# Patient Record
Sex: MALE | Race: BLACK OR AFRICAN AMERICAN | NOT HISPANIC OR LATINO | URBAN - METROPOLITAN AREA
[De-identification: names, ages, dates, MRNs, and addresses within clinical notes are randomized per-mention and may not be internally consistent; named-entity substitution may affect disease eponyms.]

---

## 2019-10-25 ENCOUNTER — EMERGENCY (EMERGENCY)
Facility: HOSPITAL | Age: 34
LOS: 1 days | Discharge: HOME | End: 2019-10-25
Attending: EMERGENCY MEDICINE | Admitting: EMERGENCY MEDICINE
Payer: COMMERCIAL

## 2019-10-25 VITALS
SYSTOLIC BLOOD PRESSURE: 116 MMHG | DIASTOLIC BLOOD PRESSURE: 76 MMHG | HEART RATE: 106 BPM | RESPIRATION RATE: 18 BRPM | TEMPERATURE: 100 F | OXYGEN SATURATION: 98 %

## 2019-10-25 LAB
ALBUMIN SERPL ELPH-MCNC: 4 G/DL — SIGNIFICANT CHANGE UP (ref 3.3–5)
ALP SERPL-CCNC: 62 U/L — SIGNIFICANT CHANGE UP (ref 40–120)
ALT FLD-CCNC: 44 U/L — HIGH (ref 4–41)
ANION GAP SERPL CALC-SCNC: 18 MMO/L — HIGH (ref 7–14)
AST SERPL-CCNC: 41 U/L — HIGH (ref 4–40)
BASOPHILS # BLD AUTO: 0.06 K/UL — SIGNIFICANT CHANGE UP (ref 0–0.2)
BASOPHILS NFR BLD AUTO: 0.4 % — SIGNIFICANT CHANGE UP (ref 0–2)
BILIRUB SERPL-MCNC: 0.4 MG/DL — SIGNIFICANT CHANGE UP (ref 0.2–1.2)
BUN SERPL-MCNC: 17 MG/DL — SIGNIFICANT CHANGE UP (ref 7–23)
CALCIUM SERPL-MCNC: 9.4 MG/DL — SIGNIFICANT CHANGE UP (ref 8.4–10.5)
CHLORIDE SERPL-SCNC: 100 MMOL/L — SIGNIFICANT CHANGE UP (ref 98–107)
CO2 SERPL-SCNC: 19 MMOL/L — LOW (ref 22–31)
CREAT SERPL-MCNC: 1.2 MG/DL — SIGNIFICANT CHANGE UP (ref 0.5–1.3)
EOSINOPHIL # BLD AUTO: 0.02 K/UL — SIGNIFICANT CHANGE UP (ref 0–0.5)
EOSINOPHIL NFR BLD AUTO: 0.1 % — SIGNIFICANT CHANGE UP (ref 0–6)
GLUCOSE SERPL-MCNC: 120 MG/DL — HIGH (ref 70–99)
HCT VFR BLD CALC: 43.5 % — SIGNIFICANT CHANGE UP (ref 39–50)
HGB BLD-MCNC: 14.1 G/DL — SIGNIFICANT CHANGE UP (ref 13–17)
IMM GRANULOCYTES NFR BLD AUTO: 0.5 % — SIGNIFICANT CHANGE UP (ref 0–1.5)
LYMPHOCYTES # BLD AUTO: 13.6 % — SIGNIFICANT CHANGE UP (ref 13–44)
LYMPHOCYTES # BLD AUTO: 2.32 K/UL — SIGNIFICANT CHANGE UP (ref 1–3.3)
MCHC RBC-ENTMCNC: 29.3 PG — SIGNIFICANT CHANGE UP (ref 27–34)
MCHC RBC-ENTMCNC: 32.4 % — SIGNIFICANT CHANGE UP (ref 32–36)
MCV RBC AUTO: 90.4 FL — SIGNIFICANT CHANGE UP (ref 80–100)
MONOCYTES # BLD AUTO: 2.28 K/UL — HIGH (ref 0–0.9)
MONOCYTES NFR BLD AUTO: 13.3 % — SIGNIFICANT CHANGE UP (ref 2–14)
NEUTROPHILS # BLD AUTO: 12.35 K/UL — HIGH (ref 1.8–7.4)
NEUTROPHILS NFR BLD AUTO: 72.1 % — SIGNIFICANT CHANGE UP (ref 43–77)
NRBC # FLD: 0 K/UL — SIGNIFICANT CHANGE UP (ref 0–0)
PLATELET # BLD AUTO: 328 K/UL — SIGNIFICANT CHANGE UP (ref 150–400)
PMV BLD: 10.2 FL — SIGNIFICANT CHANGE UP (ref 7–13)
POTASSIUM SERPL-MCNC: 3.2 MMOL/L — LOW (ref 3.5–5.3)
POTASSIUM SERPL-SCNC: 3.2 MMOL/L — LOW (ref 3.5–5.3)
PROT SERPL-MCNC: 8.4 G/DL — HIGH (ref 6–8.3)
RBC # BLD: 4.81 M/UL — SIGNIFICANT CHANGE UP (ref 4.2–5.8)
RBC # FLD: 12.7 % — SIGNIFICANT CHANGE UP (ref 10.3–14.5)
SODIUM SERPL-SCNC: 137 MMOL/L — SIGNIFICANT CHANGE UP (ref 135–145)
WBC # BLD: 17.12 K/UL — HIGH (ref 3.8–10.5)
WBC # FLD AUTO: 17.12 K/UL — HIGH (ref 3.8–10.5)

## 2019-10-25 PROCEDURE — 70491 CT SOFT TISSUE NECK W/DYE: CPT | Mod: 26

## 2019-10-25 PROCEDURE — 99218: CPT | Mod: 25

## 2019-10-25 PROCEDURE — 42700 I&D ABSCESS PERITONSILLAR: CPT | Mod: 73

## 2019-10-25 RX ORDER — KETOROLAC TROMETHAMINE 30 MG/ML
15 SYRINGE (ML) INJECTION ONCE
Refills: 0 | Status: DISCONTINUED | OUTPATIENT
Start: 2019-10-25 | End: 2019-10-25

## 2019-10-25 RX ORDER — SODIUM CHLORIDE 9 MG/ML
1000 INJECTION, SOLUTION INTRAVENOUS ONCE
Refills: 0 | Status: COMPLETED | OUTPATIENT
Start: 2019-10-25 | End: 2019-10-25

## 2019-10-25 RX ORDER — TETRACAINE/BENZOCAINE/BUTAMBEN 2%-14%-2%
1 OINTMENT (GRAM) TOPICAL ONCE
Refills: 0 | Status: COMPLETED | OUTPATIENT
Start: 2019-10-25 | End: 2019-10-25

## 2019-10-25 RX ORDER — LIDOCAINE 4 G/100G
15 CREAM TOPICAL ONCE
Refills: 0 | Status: COMPLETED | OUTPATIENT
Start: 2019-10-25 | End: 2019-10-25

## 2019-10-25 RX ORDER — ACETAMINOPHEN 500 MG
975 TABLET ORAL ONCE
Refills: 0 | Status: COMPLETED | OUTPATIENT
Start: 2019-10-25 | End: 2019-10-25

## 2019-10-25 RX ORDER — SODIUM CHLORIDE 9 MG/ML
1000 INJECTION, SOLUTION INTRAVENOUS
Refills: 0 | Status: DISCONTINUED | OUTPATIENT
Start: 2019-10-25 | End: 2019-11-17

## 2019-10-25 RX ORDER — DEXAMETHASONE 0.5 MG/5ML
10 ELIXIR ORAL ONCE
Refills: 0 | Status: COMPLETED | OUTPATIENT
Start: 2019-10-25 | End: 2019-10-25

## 2019-10-25 RX ORDER — DEXAMETHASONE 0.5 MG/5ML
10 ELIXIR ORAL
Refills: 0 | Status: COMPLETED | OUTPATIENT
Start: 2019-10-26 | End: 2019-10-26

## 2019-10-25 RX ADMIN — Medication 102 MILLIGRAM(S): at 09:30

## 2019-10-25 RX ADMIN — Medication 102 MILLIGRAM(S): at 20:28

## 2019-10-25 RX ADMIN — Medication 100 MILLIGRAM(S): at 22:23

## 2019-10-25 RX ADMIN — Medication 15 MILLIGRAM(S): at 10:45

## 2019-10-25 RX ADMIN — LIDOCAINE 15 MILLILITER(S): 4 CREAM TOPICAL at 15:16

## 2019-10-25 RX ADMIN — SODIUM CHLORIDE 1000 MILLILITER(S): 9 INJECTION, SOLUTION INTRAVENOUS at 09:30

## 2019-10-25 RX ADMIN — Medication 975 MILLIGRAM(S): at 10:30

## 2019-10-25 RX ADMIN — SODIUM CHLORIDE 125 MILLILITER(S): 9 INJECTION, SOLUTION INTRAVENOUS at 18:55

## 2019-10-25 RX ADMIN — Medication 15 MILLIGRAM(S): at 09:27

## 2019-10-25 RX ADMIN — Medication 1 SPRAY(S): at 11:00

## 2019-10-25 RX ADMIN — Medication 975 MILLIGRAM(S): at 09:25

## 2019-10-25 RX ADMIN — Medication 100 MILLIGRAM(S): at 12:30

## 2019-10-25 NOTE — ED PROVIDER NOTE - CLINICAL SUMMARY MEDICAL DECISION MAKING FREE TEXT BOX
Suspect left sided peritonsillar abscess, but exam is limited due to patient factors, also duration of symptoms and recent dental work raises the suspicion for a deeper space infection. Airway is protected at this time, will get CT of the neck to evaluate for deeper infection, give dose of steroids and IV analgesia. Silvio att: Suspect left sided peritonsillar abscess, but exam is limited due to patient factors, also duration of symptoms and recent dental work raises the suspicion for a deeper space infection. Airway is protected at this time, will get CT of the neck to evaluate for deeper infection, give dose of steroids and IV analgesia.

## 2019-10-25 NOTE — ED PROVIDER NOTE - ENMT, MLM
Airway patent, Nasal mucosa clear. Muffled voice but no stridor, tolerating secretions, mild trismus. Limited exam of posterior pharynx secondary to discomfort and gag. Uvular deviation to the right, scant bilateral tonsillar exudates

## 2019-10-25 NOTE — ED CDU PROVIDER INITIAL DAY NOTE - OBJECTIVE STATEMENT
33yom no PMH has been complaining of sore throat for the last 5-6 days, went to [facility] initially where he was diagnosed with strep throat and started on augmentin and ibuprofen. Currently on day 5 of treatment and has had minimal relief of symptoms, still with subjective fevers and increasing odynophagia, muffled voice, and malaise. Comes to ED today because symptoms have not been improving. Also reports dental extraction on 10/15 just before onset of symptoms.    As above.  Noted to have possible early left pta on ct.  ED team attempted bedside drainage.

## 2019-10-25 NOTE — ED ADULT NURSE NOTE - OBJECTIVE STATEMENT
32y/o male presents to ED with c/o sore throat.  Pt states that he had a tooth pulled on 10/14/19 and has had a progressively worsening sore throat since that time.  Pt states that he was seen at [facility] on Sunday and was dx with strep throat, placed on Augmentin and Motrin.  Pt states that he is not feeling better, still having subjective fever, decreased appetite.  Pt speaking full sentences, pt's SO states that pt's voice sounds somewhat different, pt able to swallow but states that it is painful.  No resp distress, no n/v/d, no full feeling in neck, no HA. IV established, labs drawn and sent, pt medicated as per order, will cont to monitor. Providers evaluating

## 2019-10-25 NOTE — ED CDU PROVIDER INITIAL DAY NOTE - TOBACCO USE
Unknown if ever smoked Number Of Freeze-Thaw Cycles: 2 freeze-thaw cycles Render Post-Care Instructions In Note?: no Consent: The patient's consent was obtained including but not limited to risks of crusting, scabbing, blistering, scarring, darker or lighter pigmentary change, recurrence, incomplete removal and infection. Medical Necessity Clause: This procedure was medically necessary because the lesions that were treated were: Duration Of Freeze Thaw-Cycle (Seconds): 5-10 Post-Care Instructions: I reviewed with the patient in detail post-care instructions. Patient is to wear sunprotection, and avoid picking at any of the treated lesions. Pt may apply Vaseline to crusted or scabbing areas. Detail Level: Detailed Medical Necessity Information: It is in your best interest to select a reason for this procedure from the list below. All of these items fulfill various CMS LCD requirements except the new and changing color options. Duration Of Freeze Thaw-Cycle (Seconds): 0

## 2019-10-25 NOTE — ED CDU PROVIDER INITIAL DAY NOTE - MEDICAL DECISION MAKING DETAILS
34 y/o male failed o/p abx therapy for pharyngitis.  Possible left early PTA.  Pending i&d by main ED team.  To be sent to cdu for abx, ivf, pain control.

## 2019-10-25 NOTE — ED ADULT NURSE NOTE - NSIMPLEMENTINTERV_GEN_ALL_ED
Implemented All Universal Safety Interventions:  [place] to call system. Call bell, personal items and telephone within reach. Instruct patient to call for assistance. Room bathroom lighting operational. Non-slip footwear when patient is off stretcher. Physically safe environment: no spills, clutter or unnecessary equipment. Stretcher in lowest position, wheels locked, appropriate side rails in place.

## 2019-10-25 NOTE — ED CDU PROVIDER INITIAL DAY NOTE - ATTENDING CONTRIBUTION TO CARE
CDU MD CELAYA:  I performed a face to face bedside interview with patient regarding history of present illness, review of symptoms and past medical history. I completed an independent physical exam.  I have discussed patient's plan of care with PA.   I agree with note as stated above, having amended the EMR as needed to reflect my findings. I have discussed the assessment and plan of care.  This includes during the time I functioned as the attending physician for this patient.    HPI / ROS / PE / MDM written by myself.

## 2019-10-25 NOTE — ED ADULT TRIAGE NOTE - CHIEF COMPLAINT QUOTE
states" I am having strep throat since Sunday and taking Augmentin with no relief". unable to swallow liquids/solids. taking Motrin with no relief

## 2019-10-25 NOTE — ED PROVIDER NOTE - OBJECTIVE STATEMENT
33yom no PMH has been complaining of sore throat for the last 5-6 days, went to [facility] initially where he was diagnosed with strep throat and started on augmentin and ibuprofen. Currently on day 5 of treatment and has had minimal relief of symptoms, still with subjective fevers and increasing odynophagia, muffled voice, and malaise. Comes to ED today because symptoms have not been improving. Also reports dental extraction on 10/15 just before onset of symptoms.

## 2019-10-25 NOTE — PROGRESS NOTE ADULT - SUBJECTIVE AND OBJECTIVE BOX
Patient is a 33y old  Male who presents with a chief complaint of     HPI:  33yom no PMH has been complaining of sore throat for the last 5-6 days. Pt had 2 maxillary left teeth extracted and 1 mandibular left wisdom tooth extracted on 10/15/19, was placed on Amoxicillin which he took for 4 days. Pt went to [facility] initially where he was diagnosed with strep throat and started on augmentin and ibuprofen. Currently on day 5 of treatment and has had minimal relief of symptoms, still with subjective fevers and increasing odynophagia, muffled voice, and malaise. Comes to ED today because symptoms have not been improving.       PAST MEDICAL & SURGICAL HISTORY:  No pertinent past medical history  No significant past surgical history      MEDICATIONS  (STANDING):  clindamycin IVPB      clindamycin IVPB 600 milliGRAM(s) IV Intermittent every 8 hours  lactated ringers. 1000 milliLiter(s) (125 mL/Hr) IV Continuous <Continuous>    MEDICATIONS  (PRN):      Allergies    No Known Allergies    Intolerances        FAMILY HISTORY:      *SOCIAL HISTORY: (guardian or who pt came with), (smoking hx)    *Last Dental Visit: 10/15 for extraction of maxillary teeth #14,16, mandibular tooth #17    Vital Signs Last 24 Hrs  T(C): 36.9 (25 Oct 2019 21:47), Max: 37.8 (25 Oct 2019 08:47)  T(F): 98.4 (25 Oct 2019 21:47), Max: 100.1 (25 Oct 2019 08:47)  HR: 59 (25 Oct 2019 21:47) (59 - 106)  BP: 111/73 (25 Oct 2019 21:47) (103/62 - 116/76)  BP(mean): --  RR: 18 (25 Oct 2019 21:47) (16 - 18)  SpO2: 98% (25 Oct 2019 21:47) (98% - 99%)    EOE:  TMJ ( -  ) clicks                    (  -  ) pops                    (  -  ) crepitus             Mandible FROM             Facial bones and MOM grossly intact             ( -  ) trismus             ( -  ) LAD             ( -  ) swelling             ( -  ) asymmetry             ( -  ) palpation             ( -  ) SOB             ( -  ) dysphagia             ( -  ) LOC    IOE:   secondary dentition, multiple missing teeth, previously extracted teeth sites healing wnl, negative signs of infection           hard/soft palate:  ( -  ) palatal torus           tongue/FOM WNL           labial/buccal mucosa WNL           (  - ) percussion           ( -  ) palpation           ( -  ) swelling       LABS:                        14.1   17.12 )-----------( 328      ( 25 Oct 2019 09:10 )             43.5     10-25    137  |  100  |  17  ----------------------------<  120<H>  3.2<L>   |  19<L>  |  1.20    Ca    9.4      25 Oct 2019 09:10    TPro  8.4<H>  /  Alb  4.0  /  TBili  0.4  /  DBili  x   /  AST  41<H>  /  ALT  44<H>  /  AlkPhos  62  10-25    WBC Count: 17.12 K/uL <H> [3.8 - 10.5] (10-25 @ 09:10)  Platelet Count - Automated: 328 K/uL [150 - 400] (10-25 @ 09:10)        *DENTAL RADIOGRAPHS: 1 Panoramic      ASSESSMENT: Findings eval with Dr. Gabriele Worthington (OS)  Following extraction on 10/15/19 of maxillary teeth #14,16, mandibular tooth #17, pt is healing wnl.  Negative signs of odontogenic infection, negative swelling/fistula/pain/mobility. Negative buccal/lingual swelling.  Tooth #18 (mandibular left molar)h as caries, negative acute dental infection. Advised pt to f/u with outside dentist for comp. care.  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  Negative acute signs of odontogenic infection    PROCEDURE:  Verbal and written consent given.   EOE/ IOE, radiograph    RECOMMENDATIONS:  1) F/U with dentist for comp dental treatment   2) Dental F/U with outpatient dentist for comprehensive dental care.   3) If any difficulty swallowing/breathing, fever occur, page dental.     Mainor Ocasio DMD

## 2019-10-25 NOTE — ED PROVIDER NOTE - PROGRESS NOTE DETAILS
Guszack: CT w/ evidence of phlegmonous tonsillitis, small pocket which might be developing abscess but no discrete abscess. Attempted needle aspiration, no pus obtained, unable to tolerate additional attempts due to gag reflex and discomfort. Will place in CDU for IV antibiotics and observation, repeat evaluation in the morning.

## 2019-10-25 NOTE — ED CDU PROVIDER INITIAL DAY NOTE - MUSCULOSKELETAL, MLM
Spontaneous, unlabored and symmetrical Spine appears normal, range of motion is not limited, no muscle or joint tenderness

## 2019-10-26 VITALS
DIASTOLIC BLOOD PRESSURE: 84 MMHG | HEART RATE: 77 BPM | OXYGEN SATURATION: 98 % | SYSTOLIC BLOOD PRESSURE: 120 MMHG | RESPIRATION RATE: 15 BRPM | TEMPERATURE: 99 F

## 2019-10-26 DIAGNOSIS — K08.409 PARTIAL LOSS OF TEETH, UNSPECIFIED CAUSE, UNSPECIFIED CLASS: Chronic | ICD-10-CM

## 2019-10-26 LAB
ALBUMIN SERPL ELPH-MCNC: 3.5 G/DL — SIGNIFICANT CHANGE UP (ref 3.3–5)
ALP SERPL-CCNC: 63 U/L — SIGNIFICANT CHANGE UP (ref 40–120)
ALT FLD-CCNC: 47 U/L — HIGH (ref 4–41)
ANION GAP SERPL CALC-SCNC: 15 MMO/L — HIGH (ref 7–14)
ANISOCYTOSIS BLD QL: SLIGHT — SIGNIFICANT CHANGE UP
AST SERPL-CCNC: 29 U/L — SIGNIFICANT CHANGE UP (ref 4–40)
BASOPHILS # BLD AUTO: 0.03 K/UL — SIGNIFICANT CHANGE UP (ref 0–0.2)
BASOPHILS NFR BLD AUTO: 0.2 % — SIGNIFICANT CHANGE UP (ref 0–2)
BASOPHILS NFR SPEC: 0 % — SIGNIFICANT CHANGE UP (ref 0–2)
BILIRUB SERPL-MCNC: 0.3 MG/DL — SIGNIFICANT CHANGE UP (ref 0.2–1.2)
BLASTS # FLD: 0 % — SIGNIFICANT CHANGE UP (ref 0–0)
BUN SERPL-MCNC: 22 MG/DL — SIGNIFICANT CHANGE UP (ref 7–23)
CALCIUM SERPL-MCNC: 9.6 MG/DL — SIGNIFICANT CHANGE UP (ref 8.4–10.5)
CHLORIDE SERPL-SCNC: 102 MMOL/L — SIGNIFICANT CHANGE UP (ref 98–107)
CO2 SERPL-SCNC: 21 MMOL/L — LOW (ref 22–31)
CREAT SERPL-MCNC: 0.8 MG/DL — SIGNIFICANT CHANGE UP (ref 0.5–1.3)
EOSINOPHIL # BLD AUTO: 0.02 K/UL — SIGNIFICANT CHANGE UP (ref 0–0.5)
EOSINOPHIL NFR BLD AUTO: 0.1 % — SIGNIFICANT CHANGE UP (ref 0–6)
EOSINOPHIL NFR FLD: 0 % — SIGNIFICANT CHANGE UP (ref 0–6)
GIANT PLATELETS BLD QL SMEAR: PRESENT — SIGNIFICANT CHANGE UP
GLUCOSE SERPL-MCNC: 160 MG/DL — HIGH (ref 70–99)
HCT VFR BLD CALC: 48.5 % — SIGNIFICANT CHANGE UP (ref 39–50)
HGB BLD-MCNC: 15.9 G/DL — SIGNIFICANT CHANGE UP (ref 13–17)
IMM GRANULOCYTES NFR BLD AUTO: 0.7 % — SIGNIFICANT CHANGE UP (ref 0–1.5)
LYMPHOCYTES # BLD AUTO: 1.2 K/UL — SIGNIFICANT CHANGE UP (ref 1–3.3)
LYMPHOCYTES # BLD AUTO: 6.8 % — LOW (ref 13–44)
LYMPHOCYTES NFR SPEC AUTO: 4.4 % — LOW (ref 13–44)
MACROCYTES BLD QL: SLIGHT — SIGNIFICANT CHANGE UP
MCHC RBC-ENTMCNC: 29.8 PG — SIGNIFICANT CHANGE UP (ref 27–34)
MCHC RBC-ENTMCNC: 32.8 % — SIGNIFICANT CHANGE UP (ref 32–36)
MCV RBC AUTO: 91 FL — SIGNIFICANT CHANGE UP (ref 80–100)
METAMYELOCYTES # FLD: 0 % — SIGNIFICANT CHANGE UP (ref 0–1)
MONOCYTES # BLD AUTO: 0.46 K/UL — SIGNIFICANT CHANGE UP (ref 0–0.9)
MONOCYTES NFR BLD AUTO: 2.6 % — SIGNIFICANT CHANGE UP (ref 2–14)
MONOCYTES NFR BLD: 4.4 % — SIGNIFICANT CHANGE UP (ref 2–9)
MYELOCYTES NFR BLD: 0 % — SIGNIFICANT CHANGE UP (ref 0–0)
NEUTROPHIL AB SER-ACNC: 83.3 % — HIGH (ref 43–77)
NEUTROPHILS # BLD AUTO: 15.85 K/UL — HIGH (ref 1.8–7.4)
NEUTROPHILS NFR BLD AUTO: 89.6 % — HIGH (ref 43–77)
NEUTS BAND # BLD: 7 % — HIGH (ref 0–6)
NRBC # FLD: 0 K/UL — SIGNIFICANT CHANGE UP (ref 0–0)
OTHER - HEMATOLOGY %: 0 — SIGNIFICANT CHANGE UP
PLATELET # BLD AUTO: 368 K/UL — SIGNIFICANT CHANGE UP (ref 150–400)
PLATELET COUNT - ESTIMATE: NORMAL — SIGNIFICANT CHANGE UP
PMV BLD: 10.1 FL — SIGNIFICANT CHANGE UP (ref 7–13)
POTASSIUM SERPL-MCNC: 3.9 MMOL/L — SIGNIFICANT CHANGE UP (ref 3.5–5.3)
POTASSIUM SERPL-SCNC: 3.9 MMOL/L — SIGNIFICANT CHANGE UP (ref 3.5–5.3)
PROMYELOCYTES # FLD: 0 % — SIGNIFICANT CHANGE UP (ref 0–0)
PROT SERPL-MCNC: 8.3 G/DL — SIGNIFICANT CHANGE UP (ref 6–8.3)
RBC # BLD: 5.33 M/UL — SIGNIFICANT CHANGE UP (ref 4.2–5.8)
RBC # FLD: 12.5 % — SIGNIFICANT CHANGE UP (ref 10.3–14.5)
SODIUM SERPL-SCNC: 138 MMOL/L — SIGNIFICANT CHANGE UP (ref 135–145)
VARIANT LYMPHS # BLD: 0.9 % — SIGNIFICANT CHANGE UP
WBC # BLD: 17.69 K/UL — HIGH (ref 3.8–10.5)
WBC # FLD AUTO: 17.69 K/UL — HIGH (ref 3.8–10.5)

## 2019-10-26 PROCEDURE — 99217: CPT | Mod: 25

## 2019-10-26 RX ORDER — IBUPROFEN 200 MG
1 TABLET ORAL
Qty: 20 | Refills: 0
Start: 2019-10-26 | End: 2019-10-30

## 2019-10-26 RX ORDER — KETOROLAC TROMETHAMINE 30 MG/ML
30 SYRINGE (ML) INJECTION EVERY 6 HOURS
Refills: 0 | Status: DISCONTINUED | OUTPATIENT
Start: 2019-10-26 | End: 2019-10-26

## 2019-10-26 RX ORDER — AMPICILLIN SODIUM AND SULBACTAM SODIUM 250; 125 MG/ML; MG/ML
0 INJECTION, POWDER, FOR SUSPENSION INTRAMUSCULAR; INTRAVENOUS
Qty: 0 | Refills: 0 | DISCHARGE

## 2019-10-26 RX ORDER — IBUPROFEN 200 MG
0 TABLET ORAL
Qty: 0 | Refills: 0 | DISCHARGE

## 2019-10-26 RX ADMIN — Medication 102 MILLIGRAM(S): at 04:24

## 2019-10-26 RX ADMIN — Medication 100 MILLIGRAM(S): at 06:52

## 2019-10-26 RX ADMIN — SODIUM CHLORIDE 125 MILLILITER(S): 9 INJECTION, SOLUTION INTRAVENOUS at 04:25

## 2019-10-26 NOTE — ED CDU PROVIDER DISPOSITION NOTE - NSFOLLOWUPINSTRUCTIONS_ED_ALL_ED_FT
Follow up with your Primary Medical Doctor within 2-3days. If results or reports were given to you, show copies of your reports given to you. Take all of your medications as previously prescribed.    Take Clindamycin 300mg every 8 hours for 7 days.  Take Motrin 600mg every 8hrs with food for pain   Take Tylenol 650mg 1 tab every 4-6 hours as needed for pain or fever greater than 99.9     If any worsening, new, concerning symptoms return to the ED.

## 2019-10-26 NOTE — ED CDU PROVIDER DISPOSITION NOTE - CLINICAL COURSE
34 yo male, no stated PMH other than recent tooth extraction x 3 to left side on 10/15/19; pt presented to the ED for sore throat x 5-6 days.  Pt. had been Rx'd Augmentin for "strep throat" and was on day #5 of regimen when returned to this ED stating no improvement of symptoms.  In the ED, WBC 17.12, HbA1c 5.8, CT soft tissue neck: "....The exam findings are consistent with tonsillitis, left greater than right. Small areas of focal low density involves the left tonsil measuring up to 1 cm in size which could reflect a phlegmon or early developing abscess.  No evidence for epiglottitis..."  Pt. was started on IV clindamycin; I&D was attempted by the ED Providers; per Procedure Note, pt was "unable to tolerate procedure secondary to gag, procedure aborted".  Pt. was dispo'd to CDU for continuation of IV antibiotics, supportive care measures, and general observation care / monitoring.  In addition, in the body of the CT soft tissue neck report, it was noted:  "...BONES:  A left first maxillary molar tooth extraction socket is noted.   Asymmetric left-sided gingival buccal soft tissue swelling is noted which may reflect an associated soft tissue infection, without abscess formation.  A tooth extraction socket involves the left mandibular third molar tooth socket.  Periapical lucencies surrounding the left second mandibular molar tooth with adjacent bony sclerosis which could reflect a chronic osteitis or chronic osteomyelitis from dental infection...".  A dental consult was called; per Dental provider evaluating pt, stated extraction sites are healing wnl, radiographic findings are consistent with healing process s/p tooth extraction; stated no signs of odontogenic infection at this time.  In the interim, pt objectively noted to be resting comfortably; no issues or c/o in the interim thus far. Will continue to monitor; am labs ordered.. symptomatic improvement, no dysphagia, tolerating PO. afebrile, vss.

## 2019-10-26 NOTE — ED CDU PROVIDER SUBSEQUENT DAY NOTE - HISTORY
32 yo male, no stated PMH other than recent tooth extraction x 3 to left side on 10/15/19; pt presented to the ED for sore throat x 5-6 days.  Pt. had been Rx'd Augmentin for "strep throat" and was on day #5 of regimen when returned to this ED stating no improvement of symptoms.  In the ED, WBC 17.12, HbA1c 5.8, CT soft tissue neck: "....The exam findings are consistent with tonsillitis, left greater than right. Small areas of focal low density involves the left tonsil measuring up to 1 cm in size which could reflect a phlegmon or early developing abscess.  No evidence for epiglottitis..."  Pt. was started on IV clindamycin; I&D was attempted by the ED Providers; per Procedure Note, pt was "unable to tolerate procedure secondary to gag, procedure aborted".  Pt. was dispo'd to CDU for continuation of IV antibiotics, supportive care measures, and general observation care / monitoring.  In addition, in the body of the CT soft tissue neck report, it was noted:  "...BONES:  A left first maxillary molar tooth extraction socket is noted.   Asymmetric left-sided gingival buccal soft tissue swelling is noted which may reflect an associated soft tissue infection, without abscess formation.  A tooth extraction socket involves the left mandibular third molar tooth socket.  Periapical lucencies surrounding the left second mandibular molar tooth with adjacent bony sclerosis which could reflect a chronic osteitis or chronic osteomyelitis from dental infection...".  A dental consult was called; per Dental provider evaluating pt, stated extraction sites are healing wnl, radiographic findings are consistent with healing process s/p tooth extraction; stated no signs of odontogenic infection at this time.  In the interim, pt objectively noted to be resting comfortably; no issues or c/o in the interim thus far. Will continue to monitor; am labs ordered.

## 2019-10-26 NOTE — ED CDU PROVIDER SUBSEQUENT DAY NOTE - ENMT, MLM
Airway patent. Nasal mucosa clear. Mouth with moist mucosa. Throat:  Tonsillar enlargement, mostly symmetrical with slightly more prominence noted on the left tonsillar pillar; both tonsils have exudates present.  Soft palate is symmetrical bilaterally; uvula midline and without edema; tonsils are not approximating each other.  Neck supple.  Pt verbalizing clearly and effortlessly.

## 2019-10-26 NOTE — ED CDU PROVIDER SUBSEQUENT DAY NOTE - ATTENDING CONTRIBUTION TO CARE
I performed a face to face bedside interview with patient regarding history of present illness, review of symptoms and past medical history. I completed an independent physical exam.  I have discussed patient's plan of care.   I agree with note as stated above, having amended the EMR as needed to reflect my findings. I have discussed the assessment and plan of care.  This includes during the time I functioned as the attending physician for this patient.  Attending Contribution to Care: agree with plan of PA. 34 yo male, no stated PMH other than recent tooth extraction x 3 to left side on 10/15/19; pt presented to the ED for sore throat x 5-6 days.  Pt. had been Rx'd Augmentin for "strep throat" and was on day #5 of regimen when returned to this ED stating no improvement of symptoms.  In the ED, WBC 17.12, HbA1c 5.8, CT soft tissue neck: "....The exam findings are consistent with tonsillitis, left greater than right. Small areas of focal low density involves the left tonsil measuring up to 1 cm in size which could reflect a phlegmon or early developing abscess.  No evidence for epiglottitis..."  Pt. was started on IV clindamycin; I&D was attempted by the ED Providers; per Procedure Note, pt was "unable to tolerate procedure secondary to gag, procedure aborted".  Pt. was dispo'd to CDU for continuation of IV antibiotics, supportive care measures, and general observation care / monitoring.  In addition, in the body of the CT soft tissue neck report, it was noted:  "...BONES:  A left first maxillary molar tooth extraction socket is noted.   Asymmetric left-sided gingival buccal soft tissue swelling is noted which may reflect an associated soft tissue infection, without abscess formation.  A tooth extraction socket involves the left mandibular third molar tooth socket.  Periapical lucencies surrounding the left second mandibular molar tooth with adjacent bony sclerosis which could reflect a chronic osteitis or chronic osteomyelitis from dental infection...".  A dental consult was called; per Dental provider evaluating pt, stated extraction sites are healing wnl, radiographic findings are consistent with healing process s/p tooth extraction; stated no signs of odontogenic infection at this time.  In the interim, pt objectively noted to be resting comfortably; no issues or c/o in the interim thus far. Will continue to monitor; am labs ordered.

## 2019-10-26 NOTE — ED CDU PROVIDER DISPOSITION NOTE - PATIENT PORTAL LINK FT
You can access the FollowMyHealth Patient Portal offered by [facility] by registering at the following website: [email or web address]. By joining [facility]’s FollowMyHealth portal, you will also be able to view your health information using other applications (apps) compatible with our system.

## 2019-10-26 NOTE — ED CDU PROVIDER DISPOSITION NOTE - ATTENDING CONTRIBUTION TO CARE
I performed a face to face bedside interview with patient regarding history of present illness, review of symptoms and past medical history. I completed an independent physical exam.  I have discussed patient's plan of care.   I agree with note as stated above, having amended the EMR as needed to reflect my findings. I have discussed the assessment and plan of care.  This includes during the time I functioned as the attending physician for this patient.  Attending Contribution to Care: agree with plan of pa. 32 yo male, no stated PMH other than recent tooth extraction x 3 to left side on 10/15/19; pt presented to the ED for sore throat x 5-6 days.  Pt. had been Rx'd Augmentin for "strep throat" and was on day #5 of regimen when returned to this ED stating no improvement of symptoms.  In the ED, WBC 17.12, HbA1c 5.8, CT soft tissue neck: "....The exam findings are consistent with tonsillitis, left greater than right. Small areas of focal low density involves the left tonsil measuring up to 1 cm in size which could reflect a phlegmon or early developing abscess.  No evidence for epiglottitis..."  Pt. was started on IV clindamycin; I&D was attempted by the ED Providers; per Procedure Note, pt was "unable to tolerate procedure secondary to gag, procedure aborted".  Pt. was dispo'd to CDU for continuation of IV antibiotics, supportive care measures, and general observation care / monitoring.  In addition, in the body of the CT soft tissue neck report, it was noted:  "...BONES:  A left first maxillary molar tooth extraction socket is noted.   Asymmetric left-sided gingival buccal soft tissue swelling is noted which may reflect an associated soft tissue infection, without abscess formation.  A tooth extraction socket involves the left mandibular third molar tooth socket.  Periapical lucencies surrounding the left second mandibular molar tooth with adjacent bony sclerosis which could reflect a chronic osteitis or chronic osteomyelitis from dental infection...".  A dental consult was called; per Dental provider evaluating pt, stated extraction sites are healing wnl, radiographic findings are consistent with healing process s/p tooth extraction; stated no signs of odontogenic infection at this time.  In the interim, pt objectively noted to be resting comfortably; no issues or c/o in the interim thus far. Will continue to monitor; am labs ordered.. symptomatic improvement, no dysphagia, tolerating PO. afebrile, vss.

## 2019-10-26 NOTE — ED CDU PROVIDER SUBSEQUENT DAY NOTE - PROGRESS NOTE DETAILS
CDU NORMA Parrish: Received signout on patient- seen and examined this morning with attending. Patient rested comfortably overnight. Patient reports feeling much improved this am. Tolerating a full breakfast with pain well controlled. Exam: uvula midline with no soft palate edema. Pt with erythematous, swollen and exudative tonsils. Pt voice unchanged, talking with ease with providers and on phone with family. VSS.  Appears well.

## 2022-03-03 NOTE — ED CDU PROVIDER SUBSEQUENT DAY NOTE - NO PERTINENT FAMILY HISTORY IN FIRST DEGREE RELATIVES OF:
03/02/22 2116   Patient Assessment/Suction   Respiratory Effort Unlabored   STEPHANIE Breath Sounds clear   LLL Breath Sounds diminished   RUL Breath Sounds wheezes, expiratory   RML Breath Sounds wheezes, expiratory   RLL Breath Sounds wheezes, expiratory   Rhythm/Pattern, Respiratory pattern regular   PRE-TX-O2   O2 Device (Oxygen Therapy) nasal cannula   Flow (L/min) 4   SpO2 99 %   Pulse Oximetry Type Continuous   Pulse (!) 116   Resp 20   Aerosol Therapy   $ Aerosol Therapy Charges Aerosol Treatment   Respiratory Treatment Status (SVN) given   Treatment Route (SVN) mask   Patient Position (SVN) HOB elevated   Post Treatment Assessment (SVN) increased aeration   Signs of Intolerance (SVN) none   Breath Sounds Post-Respiratory Treatment   Throughout All Fields Post-Treatment All Fields   Throughout All Fields Post-Treatment wheezes, expiratory;coarse   Post-treatment Heart Rate (beats/min) 116   Post-treatment Resp Rate (breaths/min) 20   Education   $ Education 15 min;Bronchodilator   Respiratory Evaluation   $ Care Plan Tech Time 15 min   $ Eval/Re-eval Charges Evaluation   Evaluation For New Orders      no stated FHx.

## 2023-10-16 NOTE — ED ADULT TRIAGE NOTE - NS ED NURSE BANDS TYPE
The urine really did not grow much bacteria.    Assuming you are not having symptoms, lets just recheck it when you are not fasting    Felix Name band;